# Patient Record
Sex: FEMALE | Race: WHITE | NOT HISPANIC OR LATINO | Employment: FULL TIME | URBAN - METROPOLITAN AREA
[De-identification: names, ages, dates, MRNs, and addresses within clinical notes are randomized per-mention and may not be internally consistent; named-entity substitution may affect disease eponyms.]

---

## 2024-12-03 ENCOUNTER — PHARMACY VISIT (OUTPATIENT)
Dept: PHARMACY | Facility: CLINIC | Age: 39
End: 2024-12-03
Payer: COMMERCIAL

## 2024-12-03 PROCEDURE — RXMED WILLOW AMBULATORY MEDICATION CHARGE

## 2024-12-03 RX ORDER — TIRZEPATIDE 2.5 MG/.5ML
INJECTION, SOLUTION SUBCUTANEOUS
Qty: 2 ML | Refills: 2 | OUTPATIENT
Start: 2024-11-25

## 2025-01-06 PROCEDURE — RXMED WILLOW AMBULATORY MEDICATION CHARGE

## 2025-01-07 ENCOUNTER — PHARMACY VISIT (OUTPATIENT)
Dept: PHARMACY | Facility: CLINIC | Age: 40
End: 2025-01-07
Payer: COMMERCIAL

## 2025-01-30 PROCEDURE — RXMED WILLOW AMBULATORY MEDICATION CHARGE

## 2025-02-04 ENCOUNTER — PHARMACY VISIT (OUTPATIENT)
Dept: PHARMACY | Facility: CLINIC | Age: 40
End: 2025-02-04
Payer: COMMERCIAL

## 2025-05-31 ENCOUNTER — OFFICE VISIT (OUTPATIENT)
Dept: URGENT CARE | Age: 40
End: 2025-05-31
Payer: COMMERCIAL

## 2025-05-31 ENCOUNTER — ANCILLARY PROCEDURE (OUTPATIENT)
Dept: URGENT CARE | Age: 40
End: 2025-05-31
Payer: COMMERCIAL

## 2025-05-31 VITALS
DIASTOLIC BLOOD PRESSURE: 112 MMHG | BODY MASS INDEX: 43.63 KG/M2 | SYSTOLIC BLOOD PRESSURE: 148 MMHG | WEIGHT: 278 LBS | HEART RATE: 87 BPM | HEIGHT: 67 IN | OXYGEN SATURATION: 98 % | TEMPERATURE: 98.3 F | RESPIRATION RATE: 18 BRPM

## 2025-05-31 DIAGNOSIS — S99.922A FOOT INJURY, LEFT, INITIAL ENCOUNTER: ICD-10-CM

## 2025-05-31 DIAGNOSIS — S99.922A FOOT INJURY, LEFT, INITIAL ENCOUNTER: Primary | ICD-10-CM

## 2025-05-31 DIAGNOSIS — S92.335A CLOSED NONDISPLACED FRACTURE OF THIRD METATARSAL BONE OF LEFT FOOT, INITIAL ENCOUNTER: ICD-10-CM

## 2025-05-31 DIAGNOSIS — S92.322A CLOSED DISPLACED FRACTURE OF SECOND METATARSAL BONE OF LEFT FOOT, INITIAL ENCOUNTER: ICD-10-CM

## 2025-05-31 PROCEDURE — 73630 X-RAY EXAM OF FOOT: CPT | Mod: LEFT SIDE | Performed by: FAMILY MEDICINE

## 2025-05-31 ASSESSMENT — PAIN SCALES - GENERAL: PAINLEVEL_OUTOF10: 7

## 2025-05-31 NOTE — PROGRESS NOTES
"Subjective   Patient ID: Jessenia Ramirez is a 40 y.o. female. They present today with a chief complaint of Injury (Fell and injured foot, want to get xray. - Entered by patient) and Foot Injury (Left foot injury last night/Fall outside).    History of Present Illness  HPI  Patient presents with left distal foot pain after falling at a concert last night.  Patient states that she was at [employer] in a rain storm when she slipped on the hill and injured the end of her foot.  She has had swelling and pain made worse with walking.  She denies any ankle pain.  No cuts or abrasions.  She is taking over-the-counter medicines for the discomfort.  Past Medical History  Allergies as of 05/31/2025    (No Known Allergies)       Prescriptions Prior to Admission[1]     Medical History[2]    Surgical History[3]     reports that she has never smoked. She has never used smokeless tobacco.    Review of Systems  Review of Systems       As in history of present illness                        Objective    Vitals:    05/31/25 1604   BP: (!) 148/112   BP Location: Right arm   Patient Position: Sitting   BP Cuff Size: Large adult   Pulse: 87   Resp: 18   Temp: 36.8 °C (98.3 °F)   TempSrc: Oral   SpO2: 98%   Weight: 126 kg (278 lb)   Height: 1.702 m (5' 7\")     No LMP recorded (lmp unknown).    Physical Exam  General: Vitals noted, no distress. Afebrile.   Vascular: Left lower extremity warm and dry with good peripheral pulses noted  Extremities: No peripheral edema.  Tenderness and swelling across left distal dorsal foot.  Normal movement of toes and ankle with full range of motion in both  Skin: No rash. No bruising or discoloration. No cuts or abrasions  Neuro: No focal neurologic deficits, NIH score of 0.    Procedures    Point of Care Test & Imaging Results from this visit  No results found for this visit on 05/31/25.   Imaging  No results found.    Cardiology, Vascular, and Other Imaging  No other imaging results found " for the past 2 days      Diagnostic study results (if any) were reviewed by Horace Diez MD.    Assessment/Plan   Allergies, medications, history, and pertinent labs/EKGs/Imaging reviewed by Horace Diez MD.     Medical Decision Making  At time of discharge patient was clinically well-appearing and HDS for outpatient management. The patient and/or family was educated regarding diagnosis, supportive care, OTC and Rx medications. The patient and/or family was given the opportunity to ask questions prior to discharge.  They verbalized understanding of my discussion of the plans for treatment, expected course, indications to return to  or seek further evaluation in ED, and the need for timely follow up as directed.   They were provided with a work/school excuse if requested.    Orders and Diagnoses  There are no diagnoses linked to this encounter.    Medical Admin Record      Patient disposition: Home    Electronically signed by Horace Diez MD  4:17 PM           [1] (Not in a hospital admission)  [2]   Past Medical History:  Diagnosis Date    Other specified health status     No pertinent past medical history   [3]   Past Surgical History:  Procedure Laterality Date    OTHER SURGICAL HISTORY  06/15/2020    Tonsillectomy

## 2025-05-31 NOTE — PATIENT INSTRUCTIONS
Elevate and rest foot as able  Wear postop boot and use crutches when upright  Ibuprofen 800 mg 3 times a day as needed  Tylenol for additional pain relief  Apply ice to injured area for 15 minutes several times a day  See orthopedics in 2 to 3 days as discussed.  Referral placed

## 2025-06-03 ENCOUNTER — APPOINTMENT (OUTPATIENT)
Dept: ORTHOPEDIC SURGERY | Facility: CLINIC | Age: 40
End: 2025-06-03
Payer: MEDICAID

## 2025-06-16 ENCOUNTER — APPOINTMENT (OUTPATIENT)
Dept: ORTHOPEDIC SURGERY | Facility: CLINIC | Age: 40
End: 2025-06-16
Payer: MEDICAID

## 2025-06-16 DIAGNOSIS — M79.672 LEFT FOOT PAIN: ICD-10-CM

## 2025-06-16 DIAGNOSIS — S92.342D CLOSED DISPLACED FRACTURE OF FOURTH METATARSAL BONE OF LEFT FOOT WITH ROUTINE HEALING: Primary | ICD-10-CM

## 2025-06-16 DIAGNOSIS — S92.335A CLOSED NONDISPLACED FRACTURE OF THIRD METATARSAL BONE OF LEFT FOOT, INITIAL ENCOUNTER: ICD-10-CM

## 2025-06-30 NOTE — PROGRESS NOTES
Sports Medicine Office Note    Today's Date:  06/16/2025     HPI: Jessenia Ramirez is a 40 y.o. female who presents today for evaluation of acute left foot pain.  She states her pain started 5/30/2025 when she was at a [employer] concert, slipped on a hill and landed awkwardly onto her left foot.  States she developed immediate pain and subsequent swelling with subsequent bruising and the following 1-2 days.  She had significant pain with ambulation immediately after injury.  She was evaluated the following day in urgent care and had radiographs of the left foot which revealed acute avulsion fracture of fourth metatarsal head and nondisplaced fracture of third distal metatarsal shaft.  She was prescribed walking boot and crutches for protected weightbearing as tolerated with advice to follow-up with orthopedics for further evaluation and management.  She has been taking OTC anti-inflammatories as needed which seem to provide some relief.  Has also been keeping elevated, icing, resting as needed.  States her pain has improved from roughly 8-9/10 down to roughly 3/10 currently.  She has stopped using crutches and has been ambulating in boot with improvement of pain.  Denies any interval injury/trauma.  Denies any new radiation of pain, numbness, tingling, weakness, redness, warmth.  States she still has mild swelling over her left foot with some pain on weightbearing, particularly out of boot.    She has no other complaints.    Physical Examination:     SKIN: No increased erythema, warmth, rashes, or concerning skin lesions  NEURO: Sensation is intact in the bilateral lower extremities. Strength is grossly 5 out of 5 throughout the bilateral lower extremities, unless noted below.  GAIT: Slightly antalgic, slightly painful, and tandem  MUSCULOSKELETAL: Examination of the left foot: Ankle range of motion is full and pain-free.  There is mild soft tissue swelling in mid to forefoot without significant  ecchymosis. Strength of the ankle and foot is normal. Tenderness to palpation in the region of the fourth metatarsal head and third metatarsal shaft. No tenderness to palpation over the Achilles tendon, plantar fascia, calcaneus, navicular, base of 5th, 1st MTP joint. Negative calcaneal squeeze. No increased laxity with stress of Lis Franc ligament complex. Metatarsal squeeze elicited pain at 3rd and 4th metatarsal bones. No Gaurav click.     Imaging:  Radiographs of the left foot obtained today were reviewed and revealed routine healing of third metatarsal shaft and fourth metatarsal head fractures without significant displacement or new osseous abnormalities compared to previous imaging.    The studies were reviewed by me personally in the office today.    === 06/16/25 ===    XR FOOT 3+ VIEWS LEFT    - Impression -  Redemonstration of subacute fractures through the 3rd and 4th  metatarsals without change in alignment    MACRO:  None    Signed by: Alex Palafox 6/17/2025 5:48 PM  Dictation workstation:   [id number]    Problem List Items Addressed This Visit    None  Visit Diagnoses         Codes      Closed displaced fracture of fourth metatarsal bone of left foot with routine healing    -  Primary S92.342D      Left foot pain     M79.672    Relevant Orders    XR foot left 3+ views (Completed)      Closed nondisplaced fracture of third metatarsal bone of left foot, initial encounter     S92.335A          Assessment and Plan:    We reviewed the exam and imaging findings and discussed the conservative and surgical treatment options. We agreed to continue with conservative management of left foot 3rd and 4th metatarsal fractures as she seems to be doing well with conservative treatment plan at this time.  She should continue to wear walking boot with weightbearing/ambulation, but encouraged her to come out of boot when not weightbearing and encouraged ankle/foot range of motion as tolerated.  Recommended  prescription doses of Tylenol and encouraged use of ice, compression, elevation, and rest as needed for acute flares of pain.  She may otherwise take OTC anti-inflammatory such as ibuprofen up to 600 mg 3 times daily with food as needed for acute flares of pain.  Plan for follow-up in 3-4 weeks for re-evaluation and repeat imaging, otherwise may follow-up sooner if any new concerns arise.  Discussed this plan with the patient who is understanding and agreeable.    **This note was dictated using Dragon speech recognition software and was not corrected for spelling or grammatical errors**.    Lukas Villagran DO  Primary Care Sports Medicine  [facility] [place]

## 2025-07-10 DIAGNOSIS — S92.335A CLOSED NONDISPLACED FRACTURE OF THIRD METATARSAL BONE OF LEFT FOOT, INITIAL ENCOUNTER: ICD-10-CM

## 2025-07-10 DIAGNOSIS — S92.342D CLOSED DISPLACED FRACTURE OF FOURTH METATARSAL BONE OF LEFT FOOT WITH ROUTINE HEALING: ICD-10-CM

## 2025-07-11 ENCOUNTER — APPOINTMENT (OUTPATIENT)
Dept: ORTHOPEDIC SURGERY | Facility: CLINIC | Age: 40
End: 2025-07-11
Payer: MEDICAID

## 2025-07-11 DIAGNOSIS — S92.335A CLOSED NONDISPLACED FRACTURE OF THIRD METATARSAL BONE OF LEFT FOOT, INITIAL ENCOUNTER: ICD-10-CM

## 2025-07-11 DIAGNOSIS — M79.672 LEFT FOOT PAIN: Primary | ICD-10-CM

## 2025-07-11 DIAGNOSIS — S92.342D CLOSED DISPLACED FRACTURE OF FOURTH METATARSAL BONE OF LEFT FOOT WITH ROUTINE HEALING: ICD-10-CM

## 2025-07-11 ASSESSMENT — PAIN - FUNCTIONAL ASSESSMENT: PAIN_FUNCTIONAL_ASSESSMENT: NO/DENIES PAIN

## 2025-07-11 NOTE — PROGRESS NOTES
Sports Medicine Office Note    Today's Date:  07/11/2025     HPI: Jessenia Ramirez is a 40 y.o. female who presents today for follow-up of acute left foot pain.      6/16/2025: She states her pain started 5/30/2025 when she was at a [employer] concert, slipped on a hill and landed awkwardly onto her left foot.  States she developed immediate pain and subsequent swelling with subsequent bruising and the following 1-2 days.  She had significant pain with ambulation immediately after injury.  She was evaluated the following day in urgent care and had radiographs of the left foot which revealed acute avulsion fracture of fourth metatarsal head and nondisplaced fracture of third distal metatarsal shaft.  She was prescribed walking boot and crutches for protected weightbearing as tolerated with advice to follow-up with orthopedics for further evaluation and management.  She has been taking OTC anti-inflammatories as needed which seem to provide some relief.  Has also been keeping elevated, icing, resting as needed.  States her pain has improved from roughly 8-9/10 down to roughly 3/10 currently.  She has stopped using crutches and has been ambulating in boot with improvement of pain.  Denies any interval injury/trauma.  Denies any new radiation of pain, numbness, tingling, weakness, redness, warmth.  States she still has mild swelling over her left foot with some pain on weightbearing, particularly out of boot.        She has no other complaints.    Physical Examination:     SKIN: No increased erythema, warmth, rashes, or concerning skin lesions  NEURO: Sensation is intact in the bilateral lower extremities. Strength is grossly 5 out of 5 throughout the bilateral lower extremities, unless noted below.  GAIT: Slightly antalgic, slightly painful, and tandem  MUSCULOSKELETAL: Examination of the left foot: Ankle range of motion is full and pain-free.  There is mild soft tissue swelling in mid to forefoot without  significant ecchymosis. Strength of the ankle and foot is normal. Tenderness to palpation in the region of the fourth metatarsal head and third metatarsal shaft. No tenderness to palpation over the Achilles tendon, plantar fascia, calcaneus, navicular, base of 5th, 1st MTP joint. Negative calcaneal squeeze. No increased laxity with stress of Lis Franc ligament complex. Metatarsal squeeze elicited pain at 3rd and 4th metatarsal bones. No Gaurav click.     Imaging:  Radiographs of the left foot obtained today were reviewed and revealed routine healing of third metatarsal shaft and fourth metatarsal head fractures without significant displacement or new osseous abnormalities compared to previous imaging.    The studies were reviewed by me personally in the office today.    Problem List Items Addressed This Visit    None  Visit Diagnoses         Codes      Left foot pain    -  Primary M79.672      Closed nondisplaced fracture of third metatarsal bone of left foot, initial encounter     S92.335A      Closed displaced fracture of fourth metatarsal bone of left foot with routine healing     S92.342D          Assessment and Plan:    We reviewed the exam and imaging findings and discussed the conservative and surgical treatment options. We agreed to continue with conservative management of left foot 3rd and 4th metatarsal fractures as she seems to be doing well with conservative treatment plan at this time.  May proceed with full weightbearing out of boot as tolerated, but should continue using boot if any worsening pain with weightbearing out of boot.  Recommended prescription doses of Tylenol and encouraged use of ice, compression, elevation, and rest as needed for acute flares of pain.  She may otherwise take OTC anti-inflammatory such as ibuprofen up to 600 mg 3 times daily with food as needed for acute flares of pain.  Plan for follow-up as needed if pain worsens or if any new concerns arise.  Discussed this plan with  the patient who is understanding and agreeable.    **This note was dictated using Dragon speech recognition software and was not corrected for spelling or grammatical errors**.    Lukas Villagran DO  Primary Care [facility]  [facility]